# Patient Record
Sex: FEMALE
[De-identification: names, ages, dates, MRNs, and addresses within clinical notes are randomized per-mention and may not be internally consistent; named-entity substitution may affect disease eponyms.]

---

## 2020-11-15 ENCOUNTER — HOSPITAL ENCOUNTER (EMERGENCY)
Dept: HOSPITAL 5 - ED | Age: 40
Discharge: LEFT BEFORE DISCHARGE | End: 2020-11-15
Payer: MEDICAID

## 2020-11-15 VITALS — SYSTOLIC BLOOD PRESSURE: 141 MMHG | DIASTOLIC BLOOD PRESSURE: 76 MMHG

## 2020-11-15 DIAGNOSIS — R10.9: Primary | ICD-10-CM

## 2020-11-15 DIAGNOSIS — Z53.21: ICD-10-CM
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
## 2020-12-01 ENCOUNTER — HOSPITAL ENCOUNTER (EMERGENCY)
Dept: HOSPITAL 5 - ED | Age: 40
Discharge: HOME | End: 2020-12-01
Payer: MEDICAID

## 2020-12-01 VITALS — DIASTOLIC BLOOD PRESSURE: 87 MMHG | SYSTOLIC BLOOD PRESSURE: 127 MMHG

## 2020-12-01 DIAGNOSIS — F17.200: ICD-10-CM

## 2020-12-01 DIAGNOSIS — F12.90: ICD-10-CM

## 2020-12-01 DIAGNOSIS — Z79.899: ICD-10-CM

## 2020-12-01 DIAGNOSIS — Z98.890: ICD-10-CM

## 2020-12-01 DIAGNOSIS — Z98.51: ICD-10-CM

## 2020-12-01 DIAGNOSIS — Z86.69: ICD-10-CM

## 2020-12-01 DIAGNOSIS — K59.00: Primary | ICD-10-CM

## 2020-12-01 LAB
ALBUMIN SERPL-MCNC: 4.3 G/DL (ref 3.9–5)
ALT SERPL-CCNC: 117 UNITS/L (ref 7–56)
BASOPHILS # (AUTO): 0 K/MM3 (ref 0–0.1)
BASOPHILS NFR BLD AUTO: 0.5 % (ref 0–1.8)
BUN SERPL-MCNC: 11 MG/DL (ref 7–17)
BUN/CREAT SERPL: 18 %
CALCIUM SERPL-MCNC: 10.4 MG/DL (ref 8.4–10.2)
EOSINOPHIL # BLD AUTO: 0 K/MM3 (ref 0–0.4)
EOSINOPHIL NFR BLD AUTO: 0.1 % (ref 0–4.3)
HCT VFR BLD CALC: 45.5 % (ref 30.3–42.9)
HEMOLYSIS INDEX: 7
HGB BLD-MCNC: 15.5 GM/DL (ref 10.1–14.3)
LYMPHOCYTES # BLD AUTO: 1.6 K/MM3 (ref 1.2–5.4)
LYMPHOCYTES NFR BLD AUTO: 17.8 % (ref 13.4–35)
MCHC RBC AUTO-ENTMCNC: 34 % (ref 30–34)
MCV RBC AUTO: 92 FL (ref 79–97)
MONOCYTES # (AUTO): 0.2 K/MM3 (ref 0–0.8)
MONOCYTES % (AUTO): 2.5 % (ref 0–7.3)
PLATELET # BLD: 197 K/MM3 (ref 140–440)
RBC # BLD AUTO: 4.97 M/MM3 (ref 3.65–5.03)

## 2020-12-01 PROCEDURE — 84702 CHORIONIC GONADOTROPIN TEST: CPT

## 2020-12-01 PROCEDURE — 96361 HYDRATE IV INFUSION ADD-ON: CPT

## 2020-12-01 PROCEDURE — 96375 TX/PRO/DX INJ NEW DRUG ADDON: CPT

## 2020-12-01 PROCEDURE — 36415 COLL VENOUS BLD VENIPUNCTURE: CPT

## 2020-12-01 PROCEDURE — 83690 ASSAY OF LIPASE: CPT

## 2020-12-01 PROCEDURE — 84484 ASSAY OF TROPONIN QUANT: CPT

## 2020-12-01 PROCEDURE — 85025 COMPLETE CBC W/AUTO DIFF WBC: CPT

## 2020-12-01 PROCEDURE — 99284 EMERGENCY DEPT VISIT MOD MDM: CPT

## 2020-12-01 PROCEDURE — 74177 CT ABD & PELVIS W/CONTRAST: CPT

## 2020-12-01 PROCEDURE — 96374 THER/PROPH/DIAG INJ IV PUSH: CPT

## 2020-12-01 PROCEDURE — 80053 COMPREHEN METABOLIC PANEL: CPT

## 2020-12-01 RX ADMIN — PROMETHAZINE HYDROCHLORIDE ONE: 25 TABLET ORAL at 15:16

## 2020-12-01 RX ADMIN — FAMOTIDINE ONE: 20 TABLET ORAL at 15:16

## 2020-12-01 RX ADMIN — FAMOTIDINE ONE MG: 20 TABLET ORAL at 15:04

## 2020-12-01 RX ADMIN — PROMETHAZINE HYDROCHLORIDE ONE MG: 25 TABLET ORAL at 15:04

## 2020-12-01 NOTE — CAT SCAN REPORT
CT abdomen pelvis with and without con 



INDICATION / CLINICAL INFORMATION: Severe left sided abdominal pain.



TECHNIQUE: Axial CT images were obtained through the abdomen and pelvis prior to and after IV contras
t.  All CT scans at this location are performed using CT dose reduction for ALARA by means of automat
ed exposure control. 



COMPARISON: 10/25/2018.



FINDINGS:



LOWER CHEST: Unremarkable

LIVER: Unremarkable

GALLBLADDER/BILIARY TREE: Unremarkable  

PANCREAS: Unremarkable

SPLEEN: Unremarkable

ADRENALS: Unremarkable

KIDNEYS / URETER: Partially duplicated left collecting system. No hydronephrosis. Kidneys enhance sym
metrically.

URINARY BLADDER: Unremarkable

REPRODUCTIVE ORGANS: Uterus is unremarkable for age. There is a 3 cm right ovarian cyst.

STOMACH / SMALL BOWEL: Stomach and small bowel are normal in caliber. No evidence of bowel inflammati
on. 

COLON: Moderate amount of stool distends the rectal vault. No colonic wall thickening or pericolonic 
inflammatory stranding. The appendix is normal in caliber.  

LYMPH NODES: No significant adenopathy.

VASCULATURE: No significant abnormality. 

OTHER: No free air, free fluid, or focal fluid collection is identified.



SKELETAL SYSTEM: No acute osseous findings.



IMPRESSION:



1. Moderate amount of stool distends the rectal vault. Correlate for fecal impaction.

2. Otherwise, no acute process of the abdomen or pelvis.



Signer Name: Az Nj MD 

Signed: 12/1/2020 5:37 PM

Workstation Name: [email or web address]-

## 2020-12-01 NOTE — EMERGENCY DEPARTMENT REPORT
<SAMEER GEIGER - Last Filed: 12/01/20 17:37>





ED Abdominal Pain HPI





- General


Chief Complaint: Abdominal Pain


Stated Complaint: ABD PAIN/VOMITING


Time Seen by Provider: 12/01/20 13:51


Source: patient


Mode of arrival: Wheelchair


Limitations: No Limitations





- History of Present Illness


Initial Comments: 


40-year-old female with a past medical history of protein C deficiency, history 

of PE, not currently on any anticoagulants, peptic ulcer disease, gallstones, 

and chronic pain currently on methadone presents to the ER today complaining of 

left-sided abdominal pain.  Patient states that symptoms started a day and a 

half ago.  She states that initially was an intermittent pain but today has been

more constant.  She reports associated nausea and vomiting since yesterday.  She

states that she has vomited about 3-4 times, emesis is mainly food without 

hematemesis or coffee ground  She denies any diarrhea constipation.  She denies 

any fever or chills.  She denies any UTI symptoms. She chest pain or SOB.  She 

is unsure of her last menstrual cycle, and she is not on any birth control.  She

states that she is not sure of pregnancy.  Patient denies similar pain in the 

past.  Patient states that she has not taken her methadone in 3 days, she states

that she has been able to get a ride to the clinic.  She reports no chest pain, 

shortness of breath or any other symptoms at this time.





MD Complaint: abdominal pain


-: days(s) (1.5)


Location: [facility]


Migration to: no migration


Severity scale (0 -10): 8





- Related Data


                                Home Medications











 Medication  Instructions  Recorded  Confirmed  Last Taken


 


Methadone 50 mg PO DAILY 10/26/18 10/26/18 10/23/18








                                  Previous Rx's











 Medication  Instructions  Recorded  Last Taken  Type


 


Ketorolac [Toradol] 10 mg PO Q6H PRN #20 tablet 10/29/18 Unknown Rx


 


Nystatin/Triamcin 30 gm TP BID #1 cream..g. 11/05/18 Unknown Rx





[Nystatin-Triamcinolone Cream]    


 


Docusate Sodium [Colace] 100 mg PO BID #30 capsule 12/01/20 Unknown Rx


 


Magnesium Citrate [Citrate of 296 ml PO ONCE #1 solution 12/01/20 Unknown Rx





Magnesia]    











                                    Allergies











Allergy/AdvReac Type Severity Reaction Status Date / Time


 


No Known Allergies Allergy   Verified 11/15/20 18:03














ED Review of Systems


Comment: All other systems reviewed and negative


Constitutional: denies: chills, fever


Eyes: denies: eye pain, eye discharge, vision change


ENT: denies: ear pain, throat pain


Respiratory: denies: cough, shortness of breath, wheezing


Cardiovascular: denies: chest pain, palpitations


Gastrointestinal: abdominal pain, nausea, vomiting


Genitourinary: denies: urgency, dysuria, frequency, hematuria, discharge, 

abnormal menses


Musculoskeletal: denies: back pain, joint swelling, arthralgia


Skin: denies: rash, lesions


Neurological: denies: headache, weakness, paresthesias


Psychiatric: denies: anxiety, depression


Hematological/Lymphatic: denies: easy bleeding, easy bruising





ED Past Medical Hx





- Past Medical History


Previous Medical History?: Yes


Hx Deep Vein Thrombosis: Yes (Protein C Def.)


Hx Pulmonary Embolism: Yes (Protein C Def.)


Hx Seizures: Yes (2 yrs ago)


Hx Asthma: No


Additional medical history: Gallstones, Recent admission EGD dx ulcers, 

Thickening of the esophagus and biopsies done





- Surgical History


Past Surgical History?: Yes


Hx Pacemaker: No


Hx Internal Defibrillator: No


Additional Surgical History: Tubal ligation, Right arm cyst removed





- Social History


Smoking Status: Current Every Day Smoker


Substance Use Type: Marijuana





- Medications


Home Medications: 


                                Home Medications











 Medication  Instructions  Recorded  Confirmed  Last Taken  Type


 


Methadone 50 mg PO DAILY 10/26/18 10/26/18 10/23/18 History


 


Ketorolac [Toradol] 10 mg PO Q6H PRN #20 tablet 10/29/18  Unknown Rx


 


Nystatin/Triamcin 30 gm TP BID #1 cream..g. 11/05/18  Unknown Rx





[Nystatin-Triamcinolone Cream]     


 


Docusate Sodium [Colace] 100 mg PO BID #30 capsule 12/01/20  Unknown Rx


 


Magnesium Citrate [Citrate of 296 ml PO ONCE #1 solution 12/01/20  Unknown Rx





Magnesia]     














ED Physical Exam





- General


Limitations: No Limitations


General appearance: alert, in no apparent distress





- Head


Head exam: Present: atraumatic, normocephalic, normal inspection





- Eye


Eye exam: Present: normal appearance, PERRL, EOMI





- ENT


ENT exam: Present: normal exam, normal orophraynx, mucous membranes dry





- Neck


Neck exam: Present: normal inspection





- Respiratory


Respiratory exam: Present: normal lung sounds bilaterally.  Absent: respiratory 

distress





- Cardiovascular


Cardiovascular Exam: Present: regular rate, normal rhythm, normal heart sounds





- GI/Abdominal


GI/Abdominal exam: Present: soft, tenderness ([facility]).  Absent: distended, rebound





- Extremities Exam


Extremities exam: Present: normal inspection





- Back Exam


Back exam: Present: normal inspection, full ROM





- Neurological Exam


Neurological exam: Present: alert, oriented X3, CN II-XII intact, normal gait





- Psychiatric


Psychiatric exam: Present: normal affect, normal mood





- Skin


Skin exam: Present: intact





ED Medical Decision Making





- Lab Data


Result diagrams: 


                                 12/01/20 14:13





                                 12/01/20 14:13





- Medical Decision Making


1740 -- Case/labs Discussed with Francisco Javier Pena. CT UA and EKG pending. 








ED Disposition


Clinical Impression: 


Constipation


Qualifiers:


 Constipation type: unspecified constipation type Qualified Code(s): K59.00 - 

Constipation, unspecified





Disposition: DC-01 TO HOME OR SELFCARE


Condition: Stable


Instructions:  Abdominal Pain (ED), Constipation, Adult, Constipation, Adult, 

Easy-to-Read


Prescriptions: 


Magnesium Citrate [Citrate of Magnesia] 296 ml PO ONCE #1 solution


Docusate Sodium [Colace] 100 mg PO BID #30 capsule


Referrals: 


PINO LEE MD [Staff Physician] - 3-5 Days





<FRANCISCO JAVIER PENA - Last Filed: 12/01/20 18:08>





ED Review of Systems


ROS: 


Stated complaint: ABD PAIN/VOMITING


Other details as noted in HPI








ED Course





                                   Vital Signs











  12/01/20





  12:26


 


Temperature 98.5 F


 


Pulse Rate 78


 


Respiratory 17





Rate 


 


Blood Pressure 127/87





[Left] 


 


O2 Sat by Pulse 97





Oximetry 














ED Medical Decision Making





- Lab Data


Result diagrams: 


                                 12/01/20 14:13





                                 12/01/20 14:13








Labs











  12/01/20 12/01/20 12/01/20





  14:13 14:13 14:13


 


WBC  8.8  


 


RBC  4.97  


 


Hgb  15.5 H  


 


Hct  45.5 H  


 


MCV  92  


 


MCH  31  


 


MCHC  34  


 


RDW  13.9  


 


Plt Count  197  


 


Lymph % (Auto)  17.8  


 


Mono % (Auto)  2.5  


 


Eos % (Auto)  0.1  


 


Baso % (Auto)  0.5  


 


Lymph # (Auto)  1.6  


 


Mono # (Auto)  0.2  


 


Eos # (Auto)  0.0  


 


Baso # (Auto)  0.0  


 


Seg Neutrophils %  79.1 H  


 


Seg Neutrophils #  7.0  


 


Sodium   136 L 


 


Potassium   4.0 


 


Chloride   102.3 


 


Carbon Dioxide   23 


 


Anion Gap   15 


 


BUN   11 


 


Creatinine   0.6 


 


Estimated GFR   > 60 


 


BUN/Creatinine Ratio   18 


 


Glucose   130 H 


 


Calcium   10.4 H 


 


Total Bilirubin   0.80 


 


AST   78 H 


 


ALT   117 H 


 


Alkaline Phosphatase   89 


 


Troponin T   


 


Total Protein   8.3 H 


 


Albumin   4.3 


 


Albumin/Globulin Ratio   1.1 


 


Lipase   29 


 


HCG, Quant    < 2














  12/01/20





  14:48


 


WBC 


 


RBC 


 


Hgb 


 


Hct 


 


MCV 


 


MCH 


 


MCHC 


 


RDW 


 


Plt Count 


 


Lymph % (Auto) 


 


Mono % (Auto) 


 


Eos % (Auto) 


 


Baso % (Auto) 


 


Lymph # (Auto) 


 


Mono # (Auto) 


 


Eos # (Auto) 


 


Baso # (Auto) 


 


Seg Neutrophils % 


 


Seg Neutrophils # 


 


Sodium 


 


Potassium 


 


Chloride 


 


Carbon Dioxide 


 


Anion Gap 


 


BUN 


 


Creatinine 


 


Estimated GFR 


 


BUN/Creatinine Ratio 


 


Glucose 


 


Calcium 


 


Total Bilirubin 


 


AST 


 


ALT 


 


Alkaline Phosphatase 


 


Troponin T  < 0.010


 


Total Protein 


 


Albumin 


 


Albumin/Globulin Ratio 


 


Lipase 


 


HCG, Quant 














- Radiology Data


Radiology results: report reviewed, image reviewed


Findings


Reporting MD: Az Nj Dictation Time: December 1, 2020 16:37 

: Not available Transcription Date:


 


CT abdomen pelvis with and without con  


 


INDICATION / CLINICAL INFORMATION: Severe left sided abdominal pain.  


 


TECHNIQUE: Axial CT images were obtained through the abdomen and pelvis prior to

 and after IV contrast. All CT scans at this location are performed using CT 

dose reduction for ALARA by means of automated exposure control.  


 


COMPARISON: 10/25/2018.  


 


FINDINGS:  


 


LOWER CHEST: Unremarkable  LIVER: Unremarkable  GALLBLADDER/BILIARY TREE: 

Unremarkable  PANCREAS: Unremarkable  SPLEEN: Unremarkable  ADRENALS: 

Unremarkable  KIDNEYS / URETER: Partially duplicated left collecting system. No 

hydronephrosis. Kidneys enhance symmetrically.  URINARY BLADDER: Unremarkable  

REPRODUCTIVE ORGANS: Uterus is unremarkable for age. There is a 3 cm right 

ovarian cyst.  STOMACH / SMALL BOWEL: Stomach and small bowel are normal in 

caliber. No evidence of bowel inflammation.  COLON: Moderate amount of stool 

distends the rectal vault. No colonic wall thickening or pericolonic 

inflammatory stranding. The appendix is normal in caliber.  LYMPH NODES: No 

significant adenopathy.  VASCULATURE: No significant abnormality.  OTHER: No 

free air, free fluid, or focal fluid collection is identified.  


 


SKELETAL SYSTEM: No acute osseous findings.  


 


IMPRESSION:  


 


1. Moderate amount of stool distends the rectal vault. Correlate for fecal 

impaction.  2. Otherwise, no acute process of the abdomen or pelvis.  


 


Signer Name: Az Nj MD  Signed: 12/1/2020 4:37 PM  Workstation Name: 

[email or web address]








- Medical Decision Making


CT abdomen and pelvis demonstrates moderate stool loading, no acute findings, 

discussed findings with patient, patient states last bowel movement yesterday 

scant amount "small rabbit pellets".  Discussed constipation, hydration.   Will 

DC to home with laxative of choice , hydrate as discussed, follow-up with PCP in

 2 to 3 days.  Patient verbalizes agreement and understanding with discharge 

plan.  Patient will be DC'd home in stable condition at this time.  


Critical care attestation.: 


If time is entered above; I have spent that time in minutes in the direct care 

of this critically ill patient, excluding procedure time.








ED Disposition


Is pt being admited?: No


Does the pt Need Aspirin: No


Time of Disposition: 18:08

## 2020-12-01 NOTE — EVENT NOTE
ED Screening Note


ED Screening Note: 





left sided abd pain this morning 


+n/v


no diarrhea 


normal BM yesterday 


PMHx fibromyalgia, PUD, Protein C deficiency 


adverse reaction to compazine 


unsure of last menstrual cycle 


only abd surgery hx is tubal ligation 





This initial assessment/diagnostic orders/clinical plan/treatment(s) is/are 

subject to change based on patients health status, clinical progression and re-

assessment by fellow clinical providers in the ED. Further treatment and workup 

at subsequent clinical providers discretion. Patient/guardian urged not to elope

from the ED as their condition may be serious if not clinically assessed and 

managed. 





Initial orders include: 


labs, UA


zofran ODT

## 2020-12-15 ENCOUNTER — HOSPITAL ENCOUNTER (EMERGENCY)
Dept: HOSPITAL 5 - ED | Age: 40
Discharge: HOME | End: 2020-12-15
Payer: MEDICAID

## 2020-12-15 ENCOUNTER — HOSPITAL ENCOUNTER (EMERGENCY)
Dept: HOSPITAL 5 - ED | Age: 40
Discharge: LEFT BEFORE DISCHARGE | End: 2020-12-15
Payer: MEDICAID

## 2020-12-15 ENCOUNTER — HOSPITAL ENCOUNTER (EMERGENCY)
Dept: HOSPITAL 5 - ED | Age: 40
LOS: 1 days | Discharge: HOME | End: 2020-12-16
Payer: MEDICAID

## 2020-12-15 VITALS — DIASTOLIC BLOOD PRESSURE: 80 MMHG | SYSTOLIC BLOOD PRESSURE: 124 MMHG

## 2020-12-15 DIAGNOSIS — Z53.21: ICD-10-CM

## 2020-12-15 DIAGNOSIS — Z98.51: ICD-10-CM

## 2020-12-15 DIAGNOSIS — Z98.890: ICD-10-CM

## 2020-12-15 DIAGNOSIS — F17.200: ICD-10-CM

## 2020-12-15 DIAGNOSIS — F12.90: ICD-10-CM

## 2020-12-15 DIAGNOSIS — Z79.899: ICD-10-CM

## 2020-12-15 DIAGNOSIS — Z86.711: ICD-10-CM

## 2020-12-15 DIAGNOSIS — Z88.8: ICD-10-CM

## 2020-12-15 DIAGNOSIS — R63.0: Primary | ICD-10-CM

## 2020-12-15 DIAGNOSIS — Z79.01: ICD-10-CM

## 2020-12-15 DIAGNOSIS — R10.9: Primary | ICD-10-CM

## 2020-12-15 DIAGNOSIS — Z59.0: ICD-10-CM

## 2020-12-15 DIAGNOSIS — Z86.69: ICD-10-CM

## 2020-12-15 DIAGNOSIS — Z86.718: ICD-10-CM

## 2020-12-15 DIAGNOSIS — M79.10: Primary | ICD-10-CM

## 2020-12-15 LAB
BILIRUB UR QL STRIP: (no result)
BLOOD UR QL VISUAL: (no result)
MUCOUS THREADS #/AREA URNS HPF: (no result) /HPF
PH UR STRIP: 5 [PH] (ref 5–7)
PROT UR STRIP-MCNC: (no result) MG/DL
RBC #/AREA URNS HPF: 1 /HPF (ref 0–6)
UROBILINOGEN UR-MCNC: < 2 MG/DL (ref ?–2)
WBC #/AREA URNS HPF: 2 /HPF (ref 0–6)

## 2020-12-15 PROCEDURE — 99282 EMERGENCY DEPT VISIT SF MDM: CPT

## 2020-12-15 PROCEDURE — 81025 URINE PREGNANCY TEST: CPT

## 2020-12-15 PROCEDURE — 81001 URINALYSIS AUTO W/SCOPE: CPT

## 2020-12-15 SDOH — ECONOMIC STABILITY - HOUSING INSECURITY: HOMELESSNESS: Z59.0
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
## 2020-12-16 VITALS — DIASTOLIC BLOOD PRESSURE: 72 MMHG | SYSTOLIC BLOOD PRESSURE: 125 MMHG
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
## 2021-02-03 ENCOUNTER — HOSPITAL ENCOUNTER (EMERGENCY)
Dept: HOSPITAL 5 - ED | Age: 41
Discharge: HOME | End: 2021-02-03
Payer: MEDICAID

## 2021-02-03 VITALS — SYSTOLIC BLOOD PRESSURE: 115 MMHG | DIASTOLIC BLOOD PRESSURE: 72 MMHG

## 2021-02-03 DIAGNOSIS — Z98.890: ICD-10-CM

## 2021-02-03 DIAGNOSIS — M54.6: ICD-10-CM

## 2021-02-03 DIAGNOSIS — F17.200: ICD-10-CM

## 2021-02-03 DIAGNOSIS — Z79.899: ICD-10-CM

## 2021-02-03 DIAGNOSIS — R56.9: ICD-10-CM

## 2021-02-03 DIAGNOSIS — R94.5: ICD-10-CM

## 2021-02-03 DIAGNOSIS — N39.0: Primary | ICD-10-CM

## 2021-02-03 DIAGNOSIS — R51.9: ICD-10-CM

## 2021-02-03 DIAGNOSIS — Z88.8: ICD-10-CM

## 2021-02-03 LAB
ALBUMIN SERPL-MCNC: 4.3 G/DL (ref 3.9–5)
ALT SERPL-CCNC: 70 UNITS/L (ref 7–56)
BACTERIA #/AREA URNS HPF: (no result) /HPF
BASOPHILS # (AUTO): 0 K/MM3 (ref 0–0.1)
BASOPHILS NFR BLD AUTO: 0.4 % (ref 0–1.8)
BILIRUB UR QL STRIP: (no result)
BLOOD UR QL VISUAL: (no result)
BUN SERPL-MCNC: 23 MG/DL (ref 7–17)
BUN/CREAT SERPL: 38 %
CALCIUM SERPL-MCNC: 9.2 MG/DL (ref 8.4–10.2)
EOSINOPHIL # BLD AUTO: 0.1 K/MM3 (ref 0–0.4)
EOSINOPHIL NFR BLD AUTO: 0.9 % (ref 0–4.3)
HCT VFR BLD CALC: 40.5 % (ref 30.3–42.9)
HEMOLYSIS INDEX: 4
HGB BLD-MCNC: 13.9 GM/DL (ref 10.1–14.3)
LYMPHOCYTES # BLD AUTO: 2.2 K/MM3 (ref 1.2–5.4)
LYMPHOCYTES NFR BLD AUTO: 22.2 % (ref 13.4–35)
MCHC RBC AUTO-ENTMCNC: 34 % (ref 30–34)
MCV RBC AUTO: 94 FL (ref 79–97)
MONOCYTES # (AUTO): 0.7 K/MM3 (ref 0–0.8)
MONOCYTES % (AUTO): 7 % (ref 0–7.3)
MUCOUS THREADS #/AREA URNS HPF: (no result) /HPF
PH UR STRIP: 5 [PH] (ref 5–7)
PLATELET # BLD: 197 K/MM3 (ref 140–440)
PROT UR STRIP-MCNC: (no result) MG/DL
RBC # BLD AUTO: 4.29 M/MM3 (ref 3.65–5.03)
RBC #/AREA URNS HPF: 1 /HPF (ref 0–6)
UROBILINOGEN UR-MCNC: < 2 MG/DL (ref ?–2)
WBC #/AREA URNS HPF: 7 /HPF (ref 0–6)

## 2021-02-03 PROCEDURE — 83735 ASSAY OF MAGNESIUM: CPT

## 2021-02-03 PROCEDURE — 87086 URINE CULTURE/COLONY COUNT: CPT

## 2021-02-03 PROCEDURE — 80053 COMPREHEN METABOLIC PANEL: CPT

## 2021-02-03 PROCEDURE — 36415 COLL VENOUS BLD VENIPUNCTURE: CPT

## 2021-02-03 PROCEDURE — 84703 CHORIONIC GONADOTROPIN ASSAY: CPT

## 2021-02-03 PROCEDURE — 81001 URINALYSIS AUTO W/SCOPE: CPT

## 2021-02-03 PROCEDURE — 82550 ASSAY OF CK (CPK): CPT

## 2021-02-03 PROCEDURE — 85025 COMPLETE CBC W/AUTO DIFF WBC: CPT

## 2021-02-03 NOTE — EMERGENCY DEPARTMENT REPORT
ED General Adult HPI





- General


Chief complaint: Pain General


Stated complaint: HEADACHE


Time Seen by Provider: 02/03/21 17:12


Source: patient


Mode of arrival: Ambulatory


Limitations: No Limitations





- History of Present Illness


Initial comments: 





Patient is a 40-year-old female presents emergency room complaints of vague 

symptoms that have been occurring for a month.  She states her symptoms are 

generalized body aches, headache, back ache, ear pain.  It appears that she has 

been seen for the symptoms in the past and that this is actually been going on 

for more than a month.  She has not followed up with her primary care doctor.  

She denies any fever, nausea, vomiting, diarrhea, cough, chest pain, shortness 

of breath, abdominal pain, leg pain, leg swelling.  She has an allergy to 

prochlorperazine. 





- Related Data


                                Home Medications











 Medication  Instructions  Recorded  Confirmed  Last Taken


 


Methadone 50 mg PO DAILY 10/26/18 10/26/18 10/23/18








                                  Previous Rx's











 Medication  Instructions  Recorded  Last Taken  Type


 


Ketorolac [Toradol] 10 mg PO Q6H PRN #20 tablet 10/29/18 Unknown Rx


 


Nystatin/Triamcin 30 gm TP BID #1 cream..g. 11/05/18 Unknown Rx





[Nystatin-Triamcinolone Cream]    


 


Docusate Sodium [Colace] 100 mg PO BID #30 capsule 12/01/20 Unknown Rx


 


Magnesium Citrate [Citrate of 296 ml PO ONCE #1 solution 12/01/20 Unknown Rx





Magnesia]    


 


Naproxen [EC-Naprosyn] 500 mg PO BID PRN #14 tablet.dr 02/03/21 Unknown Rx


 


cephALEXin [Keflex] 500 mg PO BID 7 Days #14 cap 02/03/21 Unknown Rx


 


methOCARBAMOL [Robaxin TAB] 500 mg PO BID PRN #14 tab 02/03/21 Unknown Rx











                                    Allergies











Allergy/AdvReac Type Severity Reaction Status Date / Time


 


prochlorperazine Allergy  Unknown Verified 02/03/21 17:09





[From Compazine]     














ED Review of Systems


ROS: 


Stated complaint: HEADACHE


Other details as noted in HPI





Comment: All other systems reviewed and negative





ED Past Medical Hx





- Past Medical History


Hx Deep Vein Thrombosis: Yes (Protein C Def.)


Hx Pulmonary Embolism: Yes (Protein C Def.)


Hx Seizures: Yes (2 yrs ago)


Hx Asthma: No


Additional medical history: Gallstones, Recent admission EGD dx ulcers, 

Thickening of the esophagus and biopsies done





- Surgical History


Hx Pacemaker: No


Hx Internal Defibrillator: No


Additional Surgical History: Tubal ligation, Right arm cyst removed





- Social History


Smoking Status: Current Every Day Smoker


Substance Use Type: None





- Medications


Home Medications: 


                                Home Medications











 Medication  Instructions  Recorded  Confirmed  Last Taken  Type


 


Methadone 50 mg PO DAILY 10/26/18 10/26/18 10/23/18 History


 


Ketorolac [Toradol] 10 mg PO Q6H PRN #20 tablet 10/29/18  Unknown Rx


 


Nystatin/Triamcin 30 gm TP BID #1 cream..g. 11/05/18  Unknown Rx





[Nystatin-Triamcinolone Cream]     


 


Docusate Sodium [Colace] 100 mg PO BID #30 capsule 12/01/20  Unknown Rx


 


Magnesium Citrate [Citrate of 296 ml PO ONCE #1 solution 12/01/20  Unknown Rx





Magnesia]     


 


Naproxen [EC-Naprosyn] 500 mg PO BID PRN #14 tablet.dr 02/03/21  Unknown Rx


 


cephALEXin [Keflex] 500 mg PO BID 7 Days #14 cap 02/03/21  Unknown Rx


 


methOCARBAMOL [Robaxin TAB] 500 mg PO BID PRN #14 tab 02/03/21  Unknown Rx














ED Physical Exam





- General


Limitations: No Limitations


General appearance: alert, in no apparent distress





- Head


Head exam: Present: atraumatic, normocephalic





- Eye


Eye exam: Present: normal appearance





- ENT


ENT exam: Present: normal orophraynx, mucous membranes moist, TM's normal 

bilaterally, normal external ear exam





- Respiratory


Respiratory exam: Present: normal lung sounds bilaterally.  Absent: respiratory 

distress, wheezes, rales, rhonchi, stridor, chest wall tenderness, accessory 

muscle use, decreased breath sounds, prolonged expiratory





- Cardiovascular


Cardiovascular Exam: Present: regular rate, normal rhythm, normal heart sounds. 

Absent: systolic murmur, diastolic murmur, rubs, gallop





- Back Exam


Back exam: Present: normal inspection, full ROM.  Absent: CVA tenderness (R), 

CVA tenderness (L), paraspinal tenderness, vertebral tenderness





- Neurological Exam


Neurological exam: Present: alert, oriented X3





- Psychiatric


Psychiatric exam: Present: normal affect, normal mood





- Skin


Skin exam: Present: warm, dry, intact





ED Course


                                   Vital Signs











  02/03/21





  17:10


 


Temperature 98.3 F


 


Pulse Rate 85


 


Respiratory 16





Rate 


 


Blood Pressure 115/72


 


O2 Sat by Pulse 96





Oximetry 














- Reevaluation(s)


Reevaluation #1: 





02/03/21 18:41


Attempted to call patient for discharge, unable to locate patient, attempted to 

call number on patient's chart and it is not in service





ED Medical Decision Making





- Lab Data


Result diagrams: 


                                 02/03/21 17:35





                                 02/03/21 17:35








                                   Lab Results











  02/03/21 02/03/21 02/03/21 Range/Units





  17:22 17:35 17:35 


 


WBC   9.8   (4.5-11.0)  K/mm3


 


RBC   4.29   (3.65-5.03)  M/mm3


 


Hgb   13.9   (10.1-14.3)  gm/dl


 


Hct   40.5   (30.3-42.9)  %


 


MCV   94   (79-97)  fl


 


MCH   32   (28-32)  pg


 


MCHC   34   (30-34)  %


 


RDW   14.4   (13.2-15.2)  %


 


Plt Count   197   (140-440)  K/mm3


 


Lymph % (Auto)   22.2   (13.4-35.0)  %


 


Mono % (Auto)   7.0   (0.0-7.3)  %


 


Eos % (Auto)   0.9   (0.0-4.3)  %


 


Baso % (Auto)   0.4   (0.0-1.8)  %


 


Lymph # (Auto)   2.2   (1.2-5.4)  K/mm3


 


Mono # (Auto)   0.7   (0.0-0.8)  K/mm3


 


Eos # (Auto)   0.1   (0.0-0.4)  K/mm3


 


Baso # (Auto)   0.0   (0.0-0.1)  K/mm3


 


Seg Neutrophils %   69.5   (40.0-70.0)  %


 


Seg Neutrophils #   6.8   (1.8-7.7)  K/mm3


 


Sodium    137  (137-145)  mmol/L


 


Potassium    4.0  (3.6-5.0)  mmol/L


 


Chloride    104.0  ()  mmol/L


 


Carbon Dioxide    20 L  (22-30)  mmol/L


 


Anion Gap    17  mmol/L


 


BUN    23 H  (7-17)  mg/dL


 


Creatinine    0.6  (0.6-1.2)  mg/dL


 


Estimated GFR    > 60  ml/min


 


BUN/Creatinine Ratio    38  %


 


Glucose    84  ()  mg/dL


 


Calcium    9.2  (8.4-10.2)  mg/dL


 


Magnesium    2.00  (1.7-2.3)  mg/dL


 


Total Bilirubin    1.00  (0.1-1.2)  mg/dL


 


AST    57 H  (5-40)  units/L


 


ALT    70 H  (7-56)  units/L


 


Alkaline Phosphatase    96  ()  units/L


 


Total Creatine Kinase    87  ()  units/L


 


Total Protein    7.1  (6.3-8.2)  g/dL


 


Albumin    4.3  (3.9-5)  g/dL


 


Albumin/Globulin Ratio    1.5  %


 


HCG, Qual     (Negative)  


 


Urine Color  Yellow    (Yellow)  


 


Urine Turbidity  Hazy    (Clear)  


 


Urine pH  5.0    (5.0-7.0)  


 


Ur Specific Gravity  1.028    (1.003-1.030)  


 


Urine Protein  <15 mg/dl    (Negative)  mg/dL


 


Urine Glucose (UA)  Neg    (Negative)  mg/dL


 


Urine Ketones  Neg    (Negative)  mg/dL


 


Urine Blood  Sm    (Negative)  


 


Urine Nitrite  Neg    (Negative)  


 


Urine Bilirubin  Neg    (Negative)  


 


Urine Urobilinogen  < 2.0    (<2.0)  mg/dL


 


Ur Leukocyte Esterase  Tr    (Negative)  


 


Urine WBC (Auto)  7.0 H    (0.0-6.0)  /HPF


 


Urine RBC (Auto)  1.0    (0.0-6.0)  /HPF


 


U Epithel Cells (Auto)  9.0    (0-13.0)  /HPF


 


Urine Bacteria (Auto)  2+    (Negative)  /HPF


 


Urine Mucus  1+    /HPF














  02/03/21 Range/Units





  17:35 


 


WBC   (4.5-11.0)  K/mm3


 


RBC   (3.65-5.03)  M/mm3


 


Hgb   (10.1-14.3)  gm/dl


 


Hct   (30.3-42.9)  %


 


MCV   (79-97)  fl


 


MCH   (28-32)  pg


 


MCHC   (30-34)  %


 


RDW   (13.2-15.2)  %


 


Plt Count   (140-440)  K/mm3


 


Lymph % (Auto)   (13.4-35.0)  %


 


Mono % (Auto)   (0.0-7.3)  %


 


Eos % (Auto)   (0.0-4.3)  %


 


Baso % (Auto)   (0.0-1.8)  %


 


Lymph # (Auto)   (1.2-5.4)  K/mm3


 


Mono # (Auto)   (0.0-0.8)  K/mm3


 


Eos # (Auto)   (0.0-0.4)  K/mm3


 


Baso # (Auto)   (0.0-0.1)  K/mm3


 


Seg Neutrophils %   (40.0-70.0)  %


 


Seg Neutrophils #   (1.8-7.7)  K/mm3


 


Sodium   (137-145)  mmol/L


 


Potassium   (3.6-5.0)  mmol/L


 


Chloride   ()  mmol/L


 


Carbon Dioxide   (22-30)  mmol/L


 


Anion Gap   mmol/L


 


BUN   (7-17)  mg/dL


 


Creatinine   (0.6-1.2)  mg/dL


 


Estimated GFR   ml/min


 


BUN/Creatinine Ratio   %


 


Glucose   ()  mg/dL


 


Calcium   (8.4-10.2)  mg/dL


 


Magnesium   (1.7-2.3)  mg/dL


 


Total Bilirubin   (0.1-1.2)  mg/dL


 


AST   (5-40)  units/L


 


ALT   (7-56)  units/L


 


Alkaline Phosphatase   ()  units/L


 


Total Creatine Kinase   ()  units/L


 


Total Protein   (6.3-8.2)  g/dL


 


Albumin   (3.9-5)  g/dL


 


Albumin/Globulin Ratio   %


 


HCG, Qual  Negative  (Negative)  


 


Urine Color   (Yellow)  


 


Urine Turbidity   (Clear)  


 


Urine pH   (5.0-7.0)  


 


Ur Specific Gravity   (1.003-1.030)  


 


Urine Protein   (Negative)  mg/dL


 


Urine Glucose (UA)   (Negative)  mg/dL


 


Urine Ketones   (Negative)  mg/dL


 


Urine Blood   (Negative)  


 


Urine Nitrite   (Negative)  


 


Urine Bilirubin   (Negative)  


 


Urine Urobilinogen   (<2.0)  mg/dL


 


Ur Leukocyte Esterase   (Negative)  


 


Urine WBC (Auto)   (0.0-6.0)  /HPF


 


Urine RBC (Auto)   (0.0-6.0)  /HPF


 


U Epithel Cells (Auto)   (0-13.0)  /HPF


 


Urine Bacteria (Auto)   (Negative)  /HPF


 


Urine Mucus   /HPF











                                   Vital Signs











  02/03/21





  17:10


 


Temperature 98.3 F


 


Pulse Rate 85


 


Respiratory 16





Rate 


 


Blood Pressure 115/72


 


O2 Sat by Pulse 96





Oximetry 














- Medical Decision Making





Patient is a 40-year-old female presents emergency room complaints of vague 

symptoms that have been occurring for a month.  She states her symptoms are 

generalized body aches, headache, back ache, ear pain.  It appears that she has 

been seen for the symptoms in the past and that this is actually been going on 

for more than a month.  She has not followed up with her primary care doctor.  

She denies any fever, nausea, vomiting, diarrhea, cough, chest pain, shortness 

of breath, abdominal pain, leg pain, leg swelling.  She has an allergy to pr

ochlorperazine.  Vitals are normal.  No abnormality on physical examination as 

documented in chart.  Labs are stable.  UA shows evidence of mild UTI.  Patient 

given prescription for naproxen, Robaxin, Keflex.  Discussed all results with 

patient answered questions.  Discussed the importance of primary care follow-up 

as she has had the symptoms for some time now.  Advised patient Please take 

medication as prescribed.  Increase your water intake.  Follow-up with your 

primary care doctor.  Return to emergency room for any new or worsening 

symptoms.  Please avoid alcohol or Tylenol use.


Critical care attestation.: 


If time is entered above; I have spent that time in minutes in the direct care 

of this critically ill patient, excluding procedure time.








ED Disposition


Clinical Impression: 


 Generalized body aches, Elevated LFTs





Back ache


Qualifiers:


 Back pain location: low back pain Chronicity: acute Back pain laterality: bilat

eral Sciatica presence: without sciatica Qualified Code(s): M54.5 - Low back 

pain





Headache


Qualifiers:


 Headache type: unspecified Headache chronicity pattern: acute headache 

Intractability: not intractable Qualified Code(s): R51.9 - Headache, unspecified





UTI (urinary tract infection)


Qualifiers:


 Urinary tract infection type: acute cystitis Hematuria presence: without 

hematuria Qualified Code(s): N30.00 - Acute cystitis without hematuria





Disposition: DC-01 TO HOME OR SELFCARE


Is pt being admited?: No


Does the pt Need Aspirin: No


Condition: Stable


Instructions:  Urinary Tract Infection, Adult, Musculoskeletal Pain


Additional Instructions: 


Please take medication as prescribed.  Increase your water intake.  Follow-up 

with your primary care doctor.  Return to emergency room for any new or 

worsening symptoms.  Please avoid alcohol or Tylenol use.


Prescriptions: 


Naproxen [EC-Naprosyn] 500 mg PO BID PRN #14 tablet.


 PRN Reason: pain


cephALEXin [Keflex] 500 mg PO BID 7 Days #14 cap


methOCARBAMOL [Robaxin TAB] 500 mg PO BID PRN #14 tab


 PRN Reason: pain


Referrals: 


ABIGAIL BLOOM MD [Staff Physician] - 3-5 Days


[facility] [Provider Group] - 3-5 Days


[facility] [Outside] - 3-5 Days


[facility], [LAB/CONTRACT] - 3-5 Days


[facility] [Outside] - 3-5 Days


Time of Disposition: 18:29


Print Language: ENGLISH

## 2022-07-22 ENCOUNTER — HOSPITAL ENCOUNTER (EMERGENCY)
Dept: HOSPITAL 5 - ED | Age: 42
LOS: 1 days | Discharge: HOME | End: 2022-07-23
Payer: MEDICAID

## 2022-07-22 VITALS — SYSTOLIC BLOOD PRESSURE: 126 MMHG | DIASTOLIC BLOOD PRESSURE: 83 MMHG

## 2022-07-22 DIAGNOSIS — K52.89: Primary | ICD-10-CM

## 2022-07-22 DIAGNOSIS — R11.2: ICD-10-CM

## 2022-07-22 LAB
ALBUMIN SERPL-MCNC: 4.5 G/DL (ref 3.9–5)
ALT SERPL-CCNC: 92 UNITS/L (ref 7–56)
BASOPHILS # (AUTO): 0 K/MM3 (ref 0–0.1)
BASOPHILS NFR BLD AUTO: 0.4 % (ref 0–1.8)
BUN SERPL-MCNC: 15 MG/DL (ref 7–17)
BUN/CREAT SERPL: 17 %
CALCIUM SERPL-MCNC: 10 MG/DL (ref 8.4–10.2)
EOSINOPHIL # BLD AUTO: 0 K/MM3 (ref 0–0.4)
EOSINOPHIL NFR BLD AUTO: 0.4 % (ref 0–4.3)
HCT VFR BLD CALC: 41.9 % (ref 30.3–42.9)
HEMOLYSIS INDEX: 15
HGB BLD-MCNC: 13.8 GM/DL (ref 10.1–14.3)
LYMPHOCYTES # BLD AUTO: 1.7 K/MM3 (ref 1.2–5.4)
LYMPHOCYTES NFR BLD AUTO: 13.6 % (ref 13.4–35)
MCHC RBC AUTO-ENTMCNC: 33 % (ref 30–34)
MCV RBC AUTO: 90 FL (ref 79–97)
MONOCYTES # (AUTO): 0.8 K/MM3 (ref 0–0.8)
MONOCYTES % (AUTO): 6.6 % (ref 0–7.3)
PLATELET # BLD: 244 K/MM3 (ref 140–440)
RBC # BLD AUTO: 4.66 M/MM3 (ref 3.65–5.03)

## 2022-07-22 PROCEDURE — 99283 EMERGENCY DEPT VISIT LOW MDM: CPT

## 2022-07-22 PROCEDURE — 85025 COMPLETE CBC W/AUTO DIFF WBC: CPT

## 2022-07-22 PROCEDURE — 36415 COLL VENOUS BLD VENIPUNCTURE: CPT

## 2022-07-22 PROCEDURE — 80053 COMPREHEN METABOLIC PANEL: CPT

## 2022-07-22 NOTE — EMERGENCY DEPARTMENT REPORT
ED N/V/D HPI





- General


Chief complaint: Nausea/Vomiting/Diarrhea


Stated complaint: EMESIS


PUI?: No


Time Seen by Provider: 07/22/22 21:26


Source: patient, EMS


Mode of arrival: Stretcher


Limitations: No Limitations





- History of Present Illness


Initial comments: 





Patient is a 42-year-old female that presents emergency room for nausea 

vomiting.  Patient denies abdominal pain.  Patient states that when she vomits 

she she sweats a lot.  Patient complains of fever.  Patient denies cough.  

Patient denies respiratory symptoms.  Patient states her symptoms been going on 

for 2 days.  Patient states her symptoms are worsening.  Patient brought in by 

EMS.





Patient denies blood in her vomitus.  Patient denies diarrhea.  Patient denies 

blood in her stool.








Patient denies recent travel.  Patient denies recent international travel.  Oliver billy denies exposure to the novel coronavirus.  Patient denies sick contacts.  

Patient denies cough.  Patient denies diarrhea.  Patient denies coming in 

contact with anybody with symptoms of the novel coronavirus.








MD complaint: nausea, vomiting


-: Sudden


Description of Vomiting: watery


Associated Abdominal Pain: No


Radiation: none


Severity: moderate





- Related Data


                                Home Medications











 Medication  Instructions  Recorded  Confirmed  Last Taken


 


Methadone 50 mg PO DAILY 10/26/18 10/26/18 10/23/18








                                  Previous Rx's











 Medication  Instructions  Recorded  Last Taken  Type


 


Ketorolac [Toradol] 10 mg PO Q6H PRN #20 tablet 10/29/18 Unknown Rx


 


Nystatin/Triamcin 30 gm TP BID #1 cream..g. 11/05/18 Unknown Rx





[Nystatin-Triamcinolone Cream]    


 


Docusate Sodium [Colace] 100 mg PO BID #30 capsule 12/01/20 Unknown Rx


 


Magnesium Citrate [Citrate of 296 ml PO ONCE #1 solution 12/01/20 Unknown Rx





Magnesia]    


 


Naproxen [EC-Naprosyn] 500 mg PO BID PRN #14 tablet.dr 02/03/21 Unknown Rx


 


cephALEXin [Keflex] 500 mg PO BID 7 Days #14 cap 02/03/21 Unknown Rx


 


methOCARBAMOL [Robaxin TAB] 500 mg PO BID PRN #14 tab 02/03/21 Unknown Rx


 


Ondansetron [Zofran Odt] 4 mg PO Q6HR PRN #25 tab.rapdis 07/23/22 Unknown Rx











                                    Allergies











Allergy/AdvReac Type Severity Reaction Status Date / Time


 


prochlorperazine Allergy  Unknown Verified 02/03/21 17:09





[From Compazine]     














ED Review of Systems


ROS: 


Stated complaint: EMESIS


Other details as noted in HPI





Constitutional: denies: chills


Eyes: denies: eye pain, eye discharge, vision change


ENT: denies: ear pain, throat pain


Respiratory: denies: cough, shortness of breath, wheezing


Cardiovascular: denies: chest pain, palpitations


Endocrine: no symptoms reported


Gastrointestinal: as per HPI, nausea, vomiting.  denies: abdominal pain, 

diarrhea


Genitourinary: denies: urgency, dysuria, discharge


Musculoskeletal: denies: back pain, joint swelling, arthralgia


Skin: denies: rash, lesions


Neurological: denies: headache, weakness, paresthesias


Psychiatric: denies: anxiety, depression


Hematological/Lymphatic: denies: easy bleeding, easy bruising





ED Past Medical Hx





- Past Medical History


Previous Medical History?: Yes


Hx Deep Vein Thrombosis: Yes (Protein C Def.)


Hx Pulmonary Embolism: Yes (Protein C Def.)


Hx Seizures: Yes (2 yrs ago)


Hx Asthma: No


Additional medical history: Gallstones, Recent admission EGD dx ulcers, 

Thickening of the esophagus and biopsies done





- Surgical History


Past Surgical History?: Yes


Hx Pacemaker: No


Hx Internal Defibrillator: No


Additional Surgical History: Tubal ligation, Right arm cyst removed





- Social History


Smoking Status: Never Smoker


Substance Use Type: None





- Medications


Home Medications: 


                                Home Medications











 Medication  Instructions  Recorded  Confirmed  Last Taken  Type


 


Methadone 50 mg PO DAILY 10/26/18 10/26/18 10/23/18 History


 


Ketorolac [Toradol] 10 mg PO Q6H PRN #20 tablet 10/29/18  Unknown Rx


 


Nystatin/Triamcin 30 gm TP BID #1 cream..g. 11/05/18  Unknown Rx





[Nystatin-Triamcinolone Cream]     


 


Docusate Sodium [Colace] 100 mg PO BID #30 capsule 12/01/20  Unknown Rx


 


Magnesium Citrate [Citrate of 296 ml PO ONCE #1 solution 12/01/20  Unknown Rx





Magnesia]     


 


Naproxen [EC-Naprosyn] 500 mg PO BID PRN #14 tablet.dr 02/03/21  Unknown Rx


 


cephALEXin [Keflex] 500 mg PO BID 7 Days #14 cap 02/03/21  Unknown Rx


 


methOCARBAMOL [Robaxin TAB] 500 mg PO BID PRN #14 tab 02/03/21  Unknown Rx


 


Ondansetron [Zofran Odt] 4 mg PO Q6HR PRN #25 tab.rapdis 07/23/22  Unknown Rx














ED Physical Exam





- General


Limitations: No Limitations


General appearance: alert, in no apparent distress





- Head


Head exam: Present: atraumatic, normocephalic





- Eye


Eye exam: Present: normal appearance





- ENT


ENT exam: Present: mucous membranes moist





- Neck


Neck exam: Present: normal inspection





- Respiratory


Respiratory exam: Present: normal lung sounds bilaterally.  Absent: respiratory 

distress, wheezes, rales, rhonchi, chest wall tenderness, accessory muscle use, 

decreased breath sounds





- Cardiovascular


Cardiovascular Exam: Present: regular rate, normal rhythm.  Absent: systolic 

murmur, diastolic murmur, rubs, gallop





- GI/Abdominal


GI/Abdominal exam: Present: soft, normal bowel sounds.  Absent: distended, 

tenderness, guarding





- Extremities Exam


Extremities exam: Present: normal inspection





- Back Exam


Back exam: Present: normal inspection





- Neurological Exam


Neurological exam: Present: alert, oriented X3





- Psychiatric


Psychiatric exam: Present: normal affect, normal mood





- Skin


Skin exam: Present: warm, dry, intact, normal color.  Absent: rash





ED Course


                                   Vital Signs











  07/22/22 07/22/22 07/22/22





  19:51 21:13 21:15


 


Temperature 98.2 F  


 


Pulse Rate 61  73


 


Respiratory 18 15 14





Rate   


 


Blood Pressure 113/79  126/83


 


O2 Sat by Pulse 96  100





Oximetry   














- Reevaluation(s)


Reevaluation #1: 


Patient tolerating p.o. intake.  Patient states her nausea and vomiting has 

resolved.  Patient has not had any vomiting while in the ER.





I discussed all results and clinical findings with patient.  I discussed plan of

care with patient.  Patient agrees with plan of care.  Patient is stable for 

discharge.  Patient will be discharged home.  Patient given discharge 

instructions.  Patient voiced understanding of discharge instructions.


07/22/22 23:59














ED Medical Decision Making





- Lab Data


Result diagrams: 


                                 07/22/22 22:00





                                 07/22/22 22:00





- Medical Decision Making





Patient is a 42-year-old female that presents emergency room for nausea, 

vomiting, sweating, feverish feeling.  Patient's vital signs were normal.  

Patient had labs done which were essentially unremarkable except for mild 

electrolyte imbalances.  Patient has a potassium of 5.4.  Patient's potassium to

be monitored as an outpatient.  Patient tolerated p.o. intake.  Patient can rehy

drate orally.  Patient not require any further emergency medical service.  

Patient not require any inpatient services.  Patient stable for discharge.





I discussed all results and clinical findings with patient.  I discussed plan of

care with patient.  Patient agrees with plan of care.  Patient is stable for 

discharge.  Patient will be discharged home.  Patient given discharge 

instructions.  Patient voiced understanding of discharge instructions.











- Differential Diagnosis


Dehydration, gastroenteritis, nausea, vomiting


Critical care attestation.: 


If time is entered above; I have spent that time in minutes in the direct care 

of this critically ill patient, excluding procedure time.








ED Disposition


Clinical Impression: 


 Gastroenteritis





Nausea & vomiting


Qualifiers:


 Vomiting type: unspecified Qualified Code(s): R11.2 - Nausea with vomiting, 

unspecified





Disposition: 01 HOME / SELF CARE / HOMELESS


Is pt being admited?: No


Does the pt Need Aspirin: No


Condition: Stable


Instructions:  Nausea, Adult, Viral Gastroenteritis, Adult, Easy-to-Read, Food 

Choices to Help Relieve Diarrhea, Adult, Nausea and Vomiting, Adult, 

Easy-to-Read, Nausea and Vomiting, Adult


Additional Instructions: 


Patient to follow-up with primary care in 2 to 3 days.  Patient to rest.  

Patient to increase water.  Patient to avoid strenuous exercise or heavy lifting

until cleared by primary care..  Patient to take Tylenol  as needed for pain.  

Patient to take meds as directed.  Patient to return to the ER if condition 

worsens, changes or new symptoms arise.


Prescriptions: 


Ondansetron [Zofran Odt] 4 mg PO Q6HR PRN #25 tab.rapdis


 PRN Reason: Nausea And Vomiting


Referrals: 


ABIGAIL BLOOM MD [Staff Physician] - 2-3 Days


Time of Disposition: 00:02

## 2022-08-08 ENCOUNTER — HOSPITAL ENCOUNTER (EMERGENCY)
Dept: HOSPITAL 5 - ED | Age: 42
LOS: 1 days | Discharge: HOME | End: 2022-08-09
Payer: MEDICAID

## 2022-08-08 DIAGNOSIS — I26.99: ICD-10-CM

## 2022-08-08 DIAGNOSIS — Z79.899: ICD-10-CM

## 2022-08-08 DIAGNOSIS — Z86.73: ICD-10-CM

## 2022-08-08 DIAGNOSIS — R07.9: Primary | ICD-10-CM

## 2022-08-08 DIAGNOSIS — Z91.09: ICD-10-CM

## 2022-08-08 DIAGNOSIS — B35.3: ICD-10-CM

## 2022-08-08 DIAGNOSIS — Z86.718: ICD-10-CM

## 2022-08-08 PROCEDURE — 99285 EMERGENCY DEPT VISIT HI MDM: CPT

## 2022-08-08 PROCEDURE — 71046 X-RAY EXAM CHEST 2 VIEWS: CPT

## 2022-08-08 PROCEDURE — 85610 PROTHROMBIN TIME: CPT

## 2022-08-08 PROCEDURE — 93005 ELECTROCARDIOGRAM TRACING: CPT

## 2022-08-08 PROCEDURE — 96361 HYDRATE IV INFUSION ADD-ON: CPT

## 2022-08-08 PROCEDURE — 80053 COMPREHEN METABOLIC PANEL: CPT

## 2022-08-08 PROCEDURE — 84484 ASSAY OF TROPONIN QUANT: CPT

## 2022-08-08 PROCEDURE — 96374 THER/PROPH/DIAG INJ IV PUSH: CPT

## 2022-08-08 PROCEDURE — 85730 THROMBOPLASTIN TIME PARTIAL: CPT

## 2022-08-08 PROCEDURE — 36415 COLL VENOUS BLD VENIPUNCTURE: CPT

## 2022-08-08 PROCEDURE — 85025 COMPLETE CBC W/AUTO DIFF WBC: CPT

## 2022-08-09 VITALS — SYSTOLIC BLOOD PRESSURE: 130 MMHG | DIASTOLIC BLOOD PRESSURE: 77 MMHG

## 2022-08-09 LAB
ALBUMIN SERPL-MCNC: 4.1 G/DL (ref 3.9–5)
ALT SERPL-CCNC: 91 UNITS/L (ref 7–56)
APTT BLD: 26.4 SEC. (ref 24.2–36.6)
BASOPHILS # (AUTO): 0 K/MM3 (ref 0–0.1)
BASOPHILS NFR BLD AUTO: 0.3 % (ref 0–1.8)
BUN SERPL-MCNC: 10 MG/DL (ref 7–17)
BUN/CREAT SERPL: 17 %
CALCIUM SERPL-MCNC: 9.3 MG/DL (ref 8.4–10.2)
EOSINOPHIL # BLD AUTO: 0.1 K/MM3 (ref 0–0.4)
EOSINOPHIL NFR BLD AUTO: 0.9 % (ref 0–4.3)
HCT VFR BLD CALC: 41.6 % (ref 30.3–42.9)
HEMOLYSIS INDEX: 8
HGB BLD-MCNC: 13.7 GM/DL (ref 10.1–14.3)
INR PPP: 0.82 (ref 0.87–1.13)
LYMPHOCYTES # BLD AUTO: 1.9 K/MM3 (ref 1.2–5.4)
LYMPHOCYTES NFR BLD AUTO: 23.1 % (ref 13.4–35)
MCHC RBC AUTO-ENTMCNC: 33 % (ref 30–34)
MCV RBC AUTO: 90 FL (ref 79–97)
MONOCYTES # (AUTO): 0.8 K/MM3 (ref 0–0.8)
MONOCYTES % (AUTO): 9.6 % (ref 0–7.3)
PLATELET # BLD: 227 K/MM3 (ref 140–440)
RBC # BLD AUTO: 4.62 M/MM3 (ref 3.65–5.03)

## 2022-08-09 NOTE — EMERGENCY DEPARTMENT REPORT
ED Chest Pain HPI





- General


Chief Complaint: Chest Pain


Stated Complaint: CHEST PAIN


Time Seen by Provider: 08/09/22 11:24


Source: patient, EMS


Mode of arrival: Stretcher


Limitations: No Limitations





- History of Present Illness


Initial Comments: 





Is a 42-year-old female with reported history of pulmonary embolism, who has 

been seen in the past for chest pain who presents to the emergency department 

complaining of left-sided chest pain that has been intermittent for 7 months, 

but which became severe and radiating into the left neck since yesterday.  P

atient also complains of fatigue, mild shortness of breath, and nausea.  Patient

denies vomiting, diarrhea, abdominal pain.  Patient denies lightheadedness or 

diaphoresis.  Denies difficulty swallowing.  Patient denies any alleviating or 

aggravating factors.





- Related Data


                                Home Medications











 Medication  Instructions  Recorded  Confirmed  Last Taken


 


Methadone 50 mg PO DAILY 10/26/18 10/26/18 10/23/18








                                  Previous Rx's











 Medication  Instructions  Recorded  Last Taken  Type


 


Ketorolac [Toradol] 10 mg PO Q6H PRN #20 tablet 10/29/18 Unknown Rx


 


Docusate Sodium [Colace] 100 mg PO BID #30 capsule 12/01/20 Unknown Rx


 


Magnesium Citrate [Citrate of 296 ml PO ONCE #1 solution 12/01/20 Unknown Rx





Magnesia]    


 


Naproxen [EC-Naprosyn] 500 mg PO BID PRN #14 tablet.dr 02/03/21 Unknown Rx


 


cephALEXin [Keflex] 500 mg PO BID 7 Days #14 cap 02/03/21 Unknown Rx


 


methOCARBAMOL [Robaxin TAB] 500 mg PO BID PRN #14 tab 02/03/21 Unknown Rx


 


Ondansetron [Zofran Odt] 4 mg PO Q6HR PRN #25 tab.rapdis 07/23/22 Unknown Rx


 


HYDROcodone/APAP 5-325 [[place] 1 each PO Q6HR PRN 3 Days #9 tablet 08/09/22 

Unknown Rx





5/325]    


 


Nystatin/Triamcin 30 gm TP BID #1 cream..g. 08/09/22 Unknown Rx





[Nystatin-Triamcinolone Cream]    











                                    Allergies











Allergy/AdvReac Type Severity Reaction Status Date / Time


 


prochlorperazine Allergy  Unknown Verified 02/03/21 17:09





[From Compazine]     














Heart Score





- HEART Score


History: Slightly suspicious


EKG: Normal


Age: < 45


Risk factors: 1-2 risk factors


Troponin: < normal limit


HEART Score: 1





- EKG Read Time


Time EKG Completed: 12:44


EKG Read Time: 12:49





ED Review of Systems


ROS: 


Stated complaint: CHEST PAIN


Other details as noted in HPI





Comment: All other systems reviewed and negative


Constitutional: malaise, weakness.  denies: chills, diaphoresis, fever


Eyes: denies: eye pain, eye discharge, vision change


ENT: denies: ear pain, throat pain


Respiratory: shortness of breath.  denies: cough, wheezing


Cardiovascular: chest pain.  denies: palpitations, syncope, paroxysmal nocturnal

dyspnea


Endocrine: no symptoms reported


Gastrointestinal: denies: abdominal pain, nausea, diarrhea


Genitourinary: denies: urgency, dysuria, discharge


Musculoskeletal: denies: back pain, joint swelling, arthralgia


Skin: denies: rash, lesions


Neurological: denies: headache, weakness, paresthesias


Psychiatric: denies: anxiety, depression


Hematological/Lymphatic: denies: easy bleeding, easy bruising





ED Past Medical Hx





- Past Medical History


Hx Deep Vein Thrombosis: Yes (Protein C Def.)


Hx Pulmonary Embolism: Yes (Protein C Def.)


Hx Seizures: Yes (2 yrs ago)


Hx Asthma: No


Additional medical history: Gallstones, Recent admission EGD dx ulcers, 

Thickening of the esophagus and biopsies done





- Surgical History


Hx Pacemaker: No


Hx Internal Defibrillator: No


Additional Surgical History: Tubal ligation, Right arm cyst removed





- Social History


Smoking Status: Never Smoker


Substance Use Type: None





- Medications


Home Medications: 


                                Home Medications











 Medication  Instructions  Recorded  Confirmed  Last Taken  Type


 


Methadone 50 mg PO DAILY 10/26/18 10/26/18 10/23/18 History


 


Ketorolac [Toradol] 10 mg PO Q6H PRN #20 tablet 10/29/18  Unknown Rx


 


Docusate Sodium [Colace] 100 mg PO BID #30 capsule 12/01/20  Unknown Rx


 


Magnesium Citrate [Citrate of 296 ml PO ONCE #1 solution 12/01/20  Unknown Rx





Magnesia]     


 


Naproxen [EC-Naprosyn] 500 mg PO BID PRN #14 tablet 02/03/21  Unknown Rx


 


cephALEXin [Keflex] 500 mg PO BID 7 Days #14 cap 02/03/21  Unknown Rx


 


methOCARBAMOL [Robaxin TAB] 500 mg PO BID PRN #14 tab 02/03/21  Unknown Rx


 


Ondansetron [Zofran Odt] 4 mg PO Q6HR PRN #25 tab.rapdis 07/23/22  Unknown Rx


 


HYDROcodone/APAP 5-325 [[place] 1 each PO Q6HR PRN 3 Days #9 tablet 08/09/22  

Unknown Rx





5/325]     


 


Nystatin/Triamcin 30 gm TP BID #1 cream..g. 08/09/22  Unknown Rx





[Nystatin-Triamcinolone Cream]     














ED Physical Exam





- General


Limitations: No Limitations


General appearance: alert, in no apparent distress, other (Unkempt)





- Head


Head exam: Present: atraumatic, normocephalic





- Eye


Eye exam: Present: normal appearance, PERRL, EOMI





- ENT


ENT exam: Present: mucous membranes moist





- Neck


Neck exam: Present: normal inspection, tenderness





- Respiratory


Respiratory exam: Present: normal lung sounds bilaterally.  Absent: respiratory 

distress, chest wall tenderness





- Cardiovascular


Cardiovascular Exam: Present: regular rate, normal rhythm.  Absent: systolic 

murmur, diastolic murmur, rubs, gallop





- GI/Abdominal


GI/Abdominal exam: Present: soft, normal bowel sounds.  Absent: tenderness, 

guarding, rebound





- Extremities Exam


Extremities exam: Present: normal inspection.  Absent: tenderness, pedal edema, 

calf tenderness





- Back Exam


Back exam: Present: normal inspection





- Neurological Exam


Neurological exam: Present: alert, oriented X3, CN II-XII intact





- Psychiatric


Psychiatric exam: Present: normal affect, normal mood





- Skin


Skin exam: Present: warm, dry, intact, normal color.  Absent: rash





ED Course


                                   Vital Signs











  08/08/22





  22:48


 


Temperature 99.1 F


 


Pulse Rate 94 H


 


Respiratory 16





Rate 


 


Blood Pressure 123/77





[Right] 


 


O2 Sat by Pulse 98





Oximetry 














- Reevaluation(s)


Reevaluation #1: 





08/09/22 17:48


Patient has been reevaluated several times, and it did take an extended period 

of time to get her blood work resulted secondary to her being a difficult IV 

stick.  Patient was unable to get her CTA chest secondary to her IV line 

blowing.  Patient is now completely asymptomatic, and is requesting to be 

discharged home without any further venipuncture or any further studies.  As her

 EKG, chest x-ray, and blood work are all within normal limits, with the patient

 will be discharged home.  Patient will follow-up with a primary care doctor, 

so.





ED Medical Decision Making





- Lab Data


Result diagrams: 


                                 08/09/22 13:05





                                 08/09/22 13:05





- Differential Diagnosis


malignancy, malingering, acs, chest pain nos


Critical care attestation.: 


If time is entered above; I have spent that time in minutes in the direct care 

of this critically ill patient, excluding procedure time.








ED Disposition


Clinical Impression: 


 Tinea pedis





Chest pain


Qualifiers:


 Chest pain type: other chest pain Qualified Code(s): R07.89 - Other chest pain;

 R07.8 - Other chest pain





Disposition: 01 HOME / SELF CARE / HOMELESS


Is pt being admited?: No


Does the pt Need Aspirin: No


Condition: Stable


Instructions:  Nonspecific Chest Pain, Adult, Athlete's Foot


Prescriptions: 


HYDROcodone/APAP 5-325 [[place] 5/325] 1 each PO Q6HR PRN 3 Days #9 tablet


 PRN Reason: Pain


Nystatin/Triamcin [Nystatin-Triamcinolone Cream] 30 gm TP BID #1 cream..g.


Referrals: 


PRIMARY CARE,MD [Primary Care Provider] - 3-5 Days


Time of Disposition: 17:54

## 2022-08-09 NOTE — XRAY REPORT
CHEST 2 VIEWS 



INDICATION / CLINICAL INFORMATION:

Chest Pain.



COMPARISON: 

None available.



FINDINGS:



SUPPORT DEVICES: None.



HEART / MEDIASTINUM: No significant abnormality. 



LUNGS / PLEURA: No significant pulmonary or pleural abnormality. No pneumothorax. 



ADDITIONAL FINDINGS: No significant additional findings.



IMPRESSION:

1. No acute findings.



Signer Name: Luis Manuel Andrade Jr, MD 

Signed: 8/9/2022 12:34 PM

Workstation Name: [id number]

## 2022-08-10 NOTE — ELECTROCARDIOGRAPH REPORT
[facility]

                                       

Test Date:    2022               Test Time:    12:44:51

Pat Name:     EVA PARRY           Department:   

Patient ID:   [id number]          Room:          

Gender:       F                        Technician:   HE

:          1980               Requested By: NICOLAS HERNANDEZ

Order Number: [id number]             Reading MD:   Kvng Harper

                                 Measurements

Intervals                              Axis          

Rate:         64                       P:            12

AZ:           180                      QRS:          54

QRSD:         67                       T:            56

QT:           419                                    

QTc:          434                                    

                           Interpretive Statements

Sinus rhythm

No previous ECG available for comparison

Electronically Signed On 8- 11:09:52 EDT by Kvng Harper